# Patient Record
Sex: FEMALE | Race: WHITE | NOT HISPANIC OR LATINO | Employment: UNEMPLOYED | URBAN - METROPOLITAN AREA
[De-identification: names, ages, dates, MRNs, and addresses within clinical notes are randomized per-mention and may not be internally consistent; named-entity substitution may affect disease eponyms.]

---

## 2017-07-05 ENCOUNTER — NURSE TRIAGE (OUTPATIENT)
Dept: NURSING | Facility: CLINIC | Age: 27
End: 2017-07-05

## 2017-07-05 NOTE — TELEPHONE ENCOUNTER
"Patient calling reporting \"cough\", sore throat, headache starting 7/3/17. Patient reporting coughing up streaks of blood today. Chest pain when breathing in and out, along with intermittent wheezing.   Reporting history of asthma and allergies. Patient has taken Albuterol inhaler with improvement.     Reason for Disposition    Wheezing is present    Additional Information    Negative: Severe difficulty breathing (e.g., struggling for each breath, speaks in single words)    Negative: Bluish lips, tongue, or face now    Negative: [1] Difficulty breathing AND [2] exposure to flames, smoke, or fumes    Negative: [1] Stridor AND [2] difficulty breathing    Negative: Sounds like a life-threatening emergency to the triager    Negative: [1] Previous asthma attacks AND [2] this feels like asthma attack    Negative: Dry (non-productive) cough (i.e., no sputum or minimal clear sputum)    Negative: Chest pain  (Exception: MILD central chest pain, present only when coughing)    Negative: Difficulty breathing    Negative: Patient sounds very sick or weak to the triager    Negative: [1] Coughed up blood AND [2] > 1 tablespoon (15 ml) (Exception: blood-tinged sputum)    Negative: Fever > 103 F (39.4 C)    Negative: [1] Fever > 101 F (38.3 C) AND [2] age > 60    Negative: [1] Fever > 101 F (38.3 C) AND [2] bedridden (e.g., nursing home patient, CVA, chronic illness, recovering from surgery)    Negative: [1] Fever > 100.5 F (38.1 C) AND [2] diabetes mellitus or weak immune system (e.g., HIV positive, cancer chemo, splenectomy, organ transplant, chronic steroids)    Protocols used: COUGH - ACUTE PRODUCTIVE-ADULT-AH    "

## 2021-12-23 ENCOUNTER — NURSE TRIAGE (OUTPATIENT)
Dept: NURSING | Facility: CLINIC | Age: 31
End: 2021-12-23
Payer: COMMERCIAL

## 2021-12-23 NOTE — TELEPHONE ENCOUNTER
Patient calling with symptoms of pressure in sinuses and head since yesterday. Has some yellowish discharge from nose.    Headache 7-8/10- has not taken Tylenol or Ibu  T-98.2    Per protocol, recommendations are for homecare.  Care advice given including nasal washes and pain reliever. Advised patient to call back if they develop any new or worsening symptoms. Patient verbalizes understanding and agrees with plan of care.    Doris Francisco RN  12/23/21 8:57 AM  [facility] Nurse Advisor    Reason for Disposition    Sinus congestion as part of a cold, present < 10 days    Additional Information    Negative: SEVERE headache and has fever    Negative: Patient sounds very sick or weak to the triager    Negative: Severe headache    Negative: Redness or swelling on the cheek, forehead, or around the eye    Negative: Fever > 100.0 F (37.8 C) and bedridden (e.g., nursing home patient, stroke, chronic illness, recovering from surgery)    Negative: Fever > 100.0 F (37.8 C) and has diabetes mellitus or a weak immune system (e.g., HIV positive, cancer chemotherapy, organ transplant, splenectomy, chronic steroids)    Negative: Fever > 101 F (38.3 C) and over 60 years of age    Negative: Fever > 103 F (39.4 C)    Negative: Fever present > 3 days (72 hours)    Negative: Fever returns after gone for over 24 hours and symptoms worse or not improved    Negative: Sinus pain (not just congestion) and fever    Negative: Earache    Negative: Difficulty breathing, and not from stuffy nose (e.g., not relieved by cleaning out the nose)    Negative: Sounds like a life-threatening emergency to the triager    Negative: SEVERE sinus pain    Negative: Sinus congestion (pressure, fullness) present > 10 days    Negative: Nasal discharge present > 10 days    Negative: Using nasal washes and pain medicine > 24 hours and sinus pain (lower forehead, cheekbone, or eye) persists    Negative: Lots of coughing    Negative: Patient wants to be  seen    Protocols used: SINUS PAIN OR CONGESTION-A-OH  COVID 19 Nurse Triage Plan/Patient Instructions    Please be aware that novel coronavirus (COVID-19) may be circulating in the community. If you develop symptoms such as fever, cough, or SOB or if you have concerns about the presence of another infection including coronavirus (COVID-19), please contact your health care provider or visit [email or web address].     Disposition/Instructions    Home care recommended. Follow home care protocol based instructions.    Thank you for taking steps to prevent the spread of this virus.  o Limit your contact with others.  o Wear a simple mask to cover your cough.  o Wash your hands well and often.    Resources    [facility]: About COVID-19: [email or web address]    CDC: What to Do If You're Sick: www.cdc.gov/coronavirus/2019-ncov/about/steps-when-sick.html    CDC: Ending Home Isolation: www.cdc.gov/coronavirus/2019-ncov/hcp/disposition-in-home-patients.html     CDC: Caring for Someone: www.cdc.gov/coronavirus/2019-ncov/if-you-are-sick/care-for-someone.html     [facility]: Interim Guidance for Hospital Discharge to Home: www.health.[facility].mn.us/diseases/coronavirus/hcp/hospdischarge.pdf    [facility] clinical trials (COVID-19 research studies): [email or web address]     Below are the COVID-19 hotlines at the [facility] of Health ([facility]). Interpreters are available.   o For health questions: Call 076-274-4960 or 1-419.567.2472 (7 a.m. to 7 p.m.)  o For questions about schools and childcare: Call [phone] or [phone] (7 a.m. to 7 p.m.)

## 2023-01-04 ENCOUNTER — NURSE TRIAGE (OUTPATIENT)
Dept: NURSING | Facility: CLINIC | Age: 33
End: 2023-01-04

## 2023-01-04 NOTE — TELEPHONE ENCOUNTER
"Nurse Triage SBAR    Is this a 2nd Level Triage? No - not established with [facility]    Situation/Background: Patient is calling that she tested positive for Covid today. Patient reports that her symptoms started Tuesday morning with slight headache, sore throat, cough, chest feels heavy, stuffy but runny nose, fatigue as of this morning. Hot/cold sweats, has not checked temperature for a fever.     Patient states she has a history of asthma, bronchospasm, with respiratory concerns.     Assessment:   Weight 160 lb, 69\"  No pain but chest pressure when coughing    Treatment Options:   Patient classified as COVID treatment eligible by Epic high risk algorithm: Yes  Is the patient symptomatic at the time of result notification? Yes. Was the onset of symptoms within the last 5 days? Yes.   There are now oral medications available for the treatment of COVID-19.  Taking one of these medications within the first five days of symptoms (when people may not yet feel severely ill) has been shown to make people feel better, prevent them from getting sicker, and preventing hospitalization and death.   Does the patient agree to have a visit with a provider to discuss treatment options? Yes. Is the patient seen at [facility]?  No: Warm transfer caller to [phone] to be scheduled with a virtual urgent provider.  During transfer, instruct  on appropriate time frame for visit     Review information with Patient    Your result was positive. This means you have COVID-19 (coronavirus).    How can I protect others?    These guidelines are for isolating before returning to work, school or .    If you DO have symptoms    Stay home and away from others     For at least 5 days after your symptoms started, AND    You are fever free for 24 hours (with no medicine that reduces fever), AND    Your other symptoms are better    Wear a mask for 10 full days anytime you are around others    If you DON'T have " symptoms    Stay home and away from others for at least 5 days after your positive test    Wear a mask for 10 full days anytime you are around others    There may be different guidelines for healthcare facilities.  Please check with the specific sites before arriving.    If you have been told by a doctor that you were severely ill with COVID-19 or are immunocompromised, you should isolate for at least 10 days.    You should not go back to work until you meet the guidelines above for ending your home isolation. You don't need to be retested for COVID-19 before going back to work--studies show that you won't spread the virus if it's been at least 10 days since your symptoms started (or 20 days, if you have a weak immune system).      How can I take care of myself?      Get lots of rest. Drink extra fluids (unless a doctor has told you not to).      Take Tylenol (acetaminophen) for fever or pain. If you have liver or kidney problems, ask your family doctor if it's okay to take Tylenol.     Take either:     650 mg (two 325 mg pills) every 4 to 6 hours, or     1,000 mg (two 500 mg pills) every 8 hours as needed.     Note: Do not take more than 3,000 mg in one day. Acetaminophen is found in many medicines (both prescribed and over-the-counter medicines). Read all labels to be sure you don't take too much.    For children, check the Tylenol bottle for the right dose (based on their age or weight).      If you have other health problems (like cancer, heart failure, an organ transplant or severe kidney disease): Call your specialty clinic if you don't feel better in the next 2 days.      Know when to call 911: Emergency warning signs include:    Trouble breathing or shortness of breath    Pain or pressure in the chest that doesn't go away    Feeling confused like you haven't felt before, or not being able to wake up  Bluish-colored lips or face    Recommendation: Per disposition, See HCP tian 4 hours (or PCP Triage).  Patient not established with [facility]. Recommended UC; provided MD Test to Treat and [facility] Treatment visit as options other than [facility]. Advised patient to call back with any new or worsening symptoms. Patient verbalized understanding and agrees with plan. Transferred patient to scheduling.     Protocol Recommended Disposition: Urgent care center    Radha Bustamante RN on 1/4/2023 at 10:20 AM  [facility] Nurse Advisors    Reason for Disposition    MILD difficulty breathing (e.g., minimal/no SOB at rest, SOB with walking, pulse <100)    Additional Information    Negative: SEVERE difficulty breathing (e.g., struggling for each breath, speaks in single words)    Negative: Difficult to awaken or acting confused (e.g., disoriented, slurred speech)    Negative: Bluish (or gray) lips or face now    Negative: Shock suspected (e.g., cold/pale/clammy skin, too weak to stand, low BP, rapid pulse)    Negative: Sounds like a life-threatening emergency to the triager    Negative: [1] Diagnosed or suspected COVID-19 AND [2] symptoms lasting 3 or more weeks    Negative: [1] COVID-19 exposure AND [2] no symptoms    Negative: COVID-19 vaccine reaction suspected (e.g., fever, headache, muscle aches) occurring 1 to 3 days after getting vaccine    Negative: COVID-19 vaccine, questions about    Negative: [1] Lives with someone known to have influenza (flu test positive) AND [2] flu-like symptoms (e.g., cough, runny nose, sore throat, SOB; with or without fever)    Negative: [1] Adult with possible COVID-19 symptoms AND [2] triager concerned about severity of symptoms or other causes    Negative: COVID-19 and breastfeeding, questions about    Negative: SEVERE or constant chest pain or pressure  (Exception: Mild central chest pain, present only when coughing.)    Negative: MODERATE difficulty breathing (e.g., speaks in phrases, SOB even at rest, pulse 100-120)    Negative: [1] Headache AND [2] stiff neck (can't touch chin to chest)     Negative: Oxygen level (e.g., pulse oximetry) 90 percent or lower     Not available    Negative: Chest pain or pressure    Negative: Patient sounds very sick or weak to the triager    Protocols used: CORONAVIRUS (COVID-19) DIAGNOSED OR [id number]

## 2023-01-25 ENCOUNTER — OFFICE VISIT (OUTPATIENT)
Dept: URGENT CARE | Facility: URGENT CARE | Age: 33
End: 2023-01-25
Payer: MEDICAID

## 2023-01-25 VITALS
BODY MASS INDEX: 23.7 KG/M2 | TEMPERATURE: 99.1 F | OXYGEN SATURATION: 98 % | WEIGHT: 160 LBS | RESPIRATION RATE: 16 BRPM | HEART RATE: 87 BPM | HEIGHT: 69 IN

## 2023-01-25 DIAGNOSIS — N76.0 BACTERIAL VAGINITIS: ICD-10-CM

## 2023-01-25 DIAGNOSIS — J02.9 SORE THROAT: ICD-10-CM

## 2023-01-25 DIAGNOSIS — R30.0 DYSURIA: ICD-10-CM

## 2023-01-25 DIAGNOSIS — B96.89 BACTERIAL VAGINITIS: ICD-10-CM

## 2023-01-25 DIAGNOSIS — J02.0 STREPTOCOCCAL PHARYNGITIS: Primary | ICD-10-CM

## 2023-01-25 LAB
ALBUMIN UR-MCNC: 30 MG/DL
APPEARANCE UR: CLEAR
BACTERIA #/AREA URNS HPF: ABNORMAL /HPF
BILIRUB UR QL STRIP: ABNORMAL
CLUE CELLS: PRESENT
COLOR UR AUTO: YELLOW
DEPRECATED S PYO AG THROAT QL EIA: POSITIVE
GLUCOSE UR STRIP-MCNC: NEGATIVE MG/DL
HGB UR QL STRIP: NEGATIVE
KETONES UR STRIP-MCNC: ABNORMAL MG/DL
LEUKOCYTE ESTERASE UR QL STRIP: ABNORMAL
NITRATE UR QL: NEGATIVE
PH UR STRIP: 6 [PH] (ref 5–7)
RBC #/AREA URNS AUTO: ABNORMAL /HPF
SP GR UR STRIP: 1.02 (ref 1–1.03)
SQUAMOUS #/AREA URNS AUTO: ABNORMAL /LPF
TRICHOMONAS, WET PREP: ABNORMAL
UROBILINOGEN UR STRIP-ACNC: 2 E.U./DL
WBC #/AREA URNS AUTO: ABNORMAL /HPF
WBC'S/HIGH POWER FIELD, WET PREP: ABNORMAL
YEAST, WET PREP: ABNORMAL

## 2023-01-25 PROCEDURE — 87591 N.GONORRHOEAE DNA AMP PROB: CPT | Performed by: NURSE PRACTITIONER

## 2023-01-25 PROCEDURE — 87880 STREP A ASSAY W/OPTIC: CPT | Performed by: NURSE PRACTITIONER

## 2023-01-25 PROCEDURE — 81001 URINALYSIS AUTO W/SCOPE: CPT | Performed by: NURSE PRACTITIONER

## 2023-01-25 PROCEDURE — 87491 CHLMYD TRACH DNA AMP PROBE: CPT | Performed by: NURSE PRACTITIONER

## 2023-01-25 PROCEDURE — 87210 SMEAR WET MOUNT SALINE/INK: CPT | Performed by: NURSE PRACTITIONER

## 2023-01-25 PROCEDURE — 99214 OFFICE O/P EST MOD 30 MIN: CPT | Performed by: NURSE PRACTITIONER

## 2023-01-25 RX ORDER — METRONIDAZOLE 500 MG/1
500 TABLET ORAL 2 TIMES DAILY
Qty: 14 TABLET | Refills: 0 | Status: SHIPPED | OUTPATIENT
Start: 2023-01-25 | End: 2023-02-01

## 2023-01-25 RX ORDER — AMOXICILLIN 500 MG/1
500 CAPSULE ORAL 2 TIMES DAILY
Qty: 20 CAPSULE | Refills: 0 | Status: SHIPPED | OUTPATIENT
Start: 2023-01-25 | End: 2023-02-04

## 2023-01-25 NOTE — PROGRESS NOTES
Chief Complaint   Patient presents with     Urgent Care     Derm Problem     All over rash started yesterday, itchy and hives. Had Covid 2 1/2 weeks ago and took Paxlovid, thought she may have had strep throat, some burning with urination as well.          ICD-10-CM    1. Streptococcal pharyngitis  J02.0 amoxicillin (AMOXIL) 500 MG capsule      2. Dysuria  R30.0 UA macro with reflex to Microscopic and Culture - Clinc Collect     Wet prep - Clinic Collect     Chlamydia trachomatis PCR - Clinic Collect     Neisseria gonorrhoeae PCR - Clinic Collect     Urine Microscopic      3. Sore throat  J02.9 Streptococcus A Rapid Screen w/Reflex to PCR - Clinic Collect      4. Bacterial vaginitis  N76.0 metroNIDAZOLE (FLAGYL) 500 MG tablet    B96.89         Medications as prescribed.  Urinalysis appears to be contaminated but the amoxicillin will likely cover UTI if she does have 1 underlying the BV.  Encouraged her to drink extra water, rest.  If she worsens she is instructed to go to the emergency room for further assessment.      Results for orders placed or performed in visit on 01/25/23 (from the past 24 hour(s))   UA macro with reflex to Microscopic and Culture - Clinc Collect    Specimen: Urine, Midstream   Result Value Ref Range    Color Urine Yellow Colorless, Straw, Light Yellow, Yellow    Appearance Urine Clear Clear    Glucose Urine Negative Negative mg/dL    Bilirubin Urine Moderate (A) Negative    Ketones Urine Trace (A) Negative mg/dL    Specific Gravity Urine 1.020 1.003 - 1.035    Blood Urine Negative Negative    pH Urine 6.0 5.0 - 7.0    Protein Albumin Urine 30 (A) Negative mg/dL    Urobilinogen Urine 2.0 (A) 0.2, 1.0 E.U./dL    Nitrite Urine Negative Negative    Leukocyte Esterase Urine Small (A) Negative   Wet prep - Clinic Collect    Specimen: Vagina; Swab   Result Value Ref Range    Trichomonas Absent Absent    Yeast Absent Absent    Clue Cells Present (A) Absent    WBCs/high power field 2+ (A) None   Urine  "Microscopic   Result Value Ref Range    Bacteria Urine Moderate (A) None Seen /HPF    RBC Urine 2-5 (A) 0-2 /HPF /HPF    WBC Urine 5-10 (A) 0-5 /HPF /HPF    Squamous Epithelials Urine Many (A) None Seen /LPF    Narrative    Urine Culture not indicated   Streptococcus A Rapid Screen w/Reflex to PCR - Clinic Collect    Specimen: Throat; Swab   Result Value Ref Range    Group A Strep antigen Positive (A) Negative       Subjective     Barbara French is an 32 year old female who presents to clinic today for rash all over body, started a day ago on the chest and shoulders and has moved into arms and legs.  Sore throat for about 9 days    Took an old prescription of amoxicillin for the last 5 days, stopped yesterday.Took Nitrofurantoin for two days and stopped this 4 days ago.      Also dysuria and low pelvic pain, white vaginal discharge and irritation for 2 days.      ROS: 10 point ROS neg other than the symptoms noted above in the HPI.       Objective    Pulse 87   Temp 99.1  F (37.3  C) (Temporal)   Resp 16   Ht 1.746 m (5' 8.75\")   Wt 72.6 kg (160 lb)   SpO2 98%   BMI 23.80 kg/m    Nurses notes and VS have been reviewed.    Physical Exam       GENERAL APPEARANCE: healthy appearing, alert     EYES: PERRL, EOMI, sclera non-icteric     HENT: ear canals and TM's normal, nose and mouth without ulcers or lesions, tonsillar hypertrophy and tonsillar erythema     NECK: no adenopathy or asymmetry, thyroid normal to palpation     RESP: lungs clear to auscultation - no rales, rhonchi or wheezes     CV: regular rates and rhythm, no murmurs, rubs, or gallop     SKIN: no suspicious lesions or rashes    Patient Instructions   You have a bacterial infection called bacterial vaginosis/vagintis. This is not a sexually transmitted disease and your partner does not need to be treated.    Please take the medications as prescribed and do not have sexual intercourse until all the medication is gone.  Do not drink alcohol while " taking this medications or you will become very sick.     This medication may interfere with birth control medications. While taking the antibiotic I would recommend using a second method of birth control.    Follow up if symptoms fail to improve or worsen.       Streptococcus bacteria (strep throat) causes a severe sore throat, rash, fevers, swollen glands and an upset stomach.  If not treated appropriately/completely, it can damage the valves in the heart and the kidneys.  Take all medications until completely gone even though you may be feeling better before your doses end. This helps to prevent bacteria from becoming resistant to antibiotics and prevents super bugs from developing.    The body will fight this infection but it needs to be treated well in order to help heal itself.  Rest as needed.  It is ok to reduce food intake if appetite is poor but it is quite important to maintain/increase fluid intake.    For pain and fevers, acetaminophen (Tylenol) is most appropriate.  Ibuprofen (Advil) or naproxen (Aleve) are useful too and last longer but they can cause elevation of blood pressure or stomach problems.    A warm, salt water gargle will help soothe the throat.  This can be made with 1/4 tsp of salt per 8 oz of water).    If the symptoms get worse or last longer than a week, you should contact the clinic.       LORI Azar, [facility] Urgent Care Provider    The use of Dragon/PredictionIO dictation services may have been used to construct the content in this note; any grammatical or spelling errors are non-intentional. Please contact the author of this note directly if you are in need of any clarification.

## 2023-01-25 NOTE — PATIENT INSTRUCTIONS
You have a bacterial infection called bacterial vaginosis/vagintis. This is not a sexually transmitted disease and your partner does not need to be treated.    Please take the medications as prescribed and do not have sexual intercourse until all the medication is gone.  Do not drink alcohol while taking this medications or you will become very sick.     This medication may interfere with birth control medications. While taking the antibiotic I would recommend using a second method of birth control.    Follow up if symptoms fail to improve or worsen.       Streptococcus bacteria (strep throat) causes a severe sore throat, rash, fevers, swollen glands and an upset stomach.  If not treated appropriately/completely, it can damage the valves in the heart and the kidneys.  Take all medications until completely gone even though you may be feeling better before your doses end. This helps to prevent bacteria from becoming resistant to antibiotics and prevents super bugs from developing.    The body will fight this infection but it needs to be treated well in order to help heal itself.  Rest as needed.  It is ok to reduce food intake if appetite is poor but it is quite important to maintain/increase fluid intake.    For pain and fevers, acetaminophen (Tylenol) is most appropriate.  Ibuprofen (Advil) or naproxen (Aleve) are useful too and last longer but they can cause elevation of blood pressure or stomach problems.    A warm, salt water gargle will help soothe the throat.  This can be made with 1/4 tsp of salt per 8 oz of water).    If the symptoms get worse or last longer than a week, you should contact the clinic.

## 2023-01-25 NOTE — LETTER
January 25, 2023      Barbara MANUEL Manolo  [address] [facility] [address]        To Whom It May Concern:    Barbara MANUEL Manolo  was seen on 1/25/2023.  Please excuse her  until 1/27/2023 due to illness.        Sincerely,        Gosia Kidd, CNP

## 2023-01-26 LAB
C TRACH DNA SPEC QL NAA+PROBE: NEGATIVE
N GONORRHOEA DNA SPEC QL NAA+PROBE: NEGATIVE

## 2023-04-02 ENCOUNTER — HEALTH MAINTENANCE LETTER (OUTPATIENT)
Age: 33
End: 2023-04-02

## 2023-09-13 ENCOUNTER — NURSE TRIAGE (OUTPATIENT)
Dept: NURSING | Facility: CLINIC | Age: 33
End: 2023-09-13
Payer: COMMERCIAL

## 2023-09-13 NOTE — TELEPHONE ENCOUNTER
"Pt had head injury, Saturday night. [place] in [place], foot got caught on log and pt fell down anuel about 10 feet. Hit head twice from fall  Mild nausea, started within last 48 hours.  Mild headache, on temple to top of head, since Sunday when waking up  Took Advil today, does not seem to help with headache, does not seem to get better or worsening.    Triage to ED, care advice given.    Manolo Guajardo RN, BSN  9/13/2023 at 5:07 PM  [facility]        Reason for Disposition   Dangerous injury (e.g., MVA, diving, trampoline, contact sports, fall > 10 feet or 3 meters) or severe blow from hard object (e.g., golf club or baseball bat)    Additional Information   Negative: [1] ACUTE NEURO SYMPTOM AND [2] present now  (DEFINITION: difficult to awaken OR confused thinking and talking OR slurred speech OR weakness of arms OR unsteady walking)   Negative: Knocked out (unconscious) > 1 minute   Negative: Seizure (convulsion) occurred  (Exception: Prior history of seizures and now alert and without Acute Neuro Symptoms.)   Negative: Penetrating head injury (e.g., knife, gun shot wound, metal object)   Negative: [1] Major bleeding (e.g., actively dripping or spurting) AND [2] can't be stopped   Negative: [1] Dangerous mechanism of injury (e.g., MVA, diving, trampoline, contact sports, fall > 10 feet or 3 meters) AND [2] NECK pain AND [3] began < 1 hour after injury   Negative: Sounds like a life-threatening emergency to the triager   Negative: Can't remember what happened (amnesia)   Negative: Vomiting once or more   Negative: [1] Loss of vision or double vision AND [2] present now   Negative: Watery or blood-tinged fluid dripping from the NOSE or EARS now  (Exception: Tears from crying or nosebleed from nasal trauma.)   Negative: [1] One or two \"black eyes\" (bruising, purple color of eyelids) AND [2] onset within 24 hours of head injury   Negative: Large swelling or bruise > 2 inches (5 cm)   Negative: " Skin is split open or gaping  (or length > 1/2 inch or 12 mm)   Negative: [1] Bleeding AND [2] won't stop after 10 minutes of direct pressure (using correct technique)   Negative: [1] ACUTE NEURO SYMPTOM AND [2] now fine  (DEFINITION: difficult to awaken OR confused thinking and talking OR slurred speech OR weakness of arms OR unsteady walking)   Negative: [1] Knocked out (unconscious) < 1 minute AND [2] now fine   Negative: [1] SEVERE headache AND [2] not improved 2 hours after pain medicine/ice packs   Negative: Sounds like a serious injury to the triager    Protocols used: Head Injury-A-AH

## 2023-09-14 ENCOUNTER — TELEPHONE (OUTPATIENT)
Dept: FAMILY MEDICINE | Facility: CLINIC | Age: 33
End: 2023-09-14

## 2023-09-14 NOTE — TELEPHONE ENCOUNTER
Patient was triaged yesterday and protocol directed patient to the ER.  Patient did not go and is wondering what they would do if she were to come to Urgent Care instead.  Advised they can do a neuro check, but we do not have a scanner in the clinic.  Slight HA that worsens throughout the day and she does work on computers.  She will try to go to Urgent Care today to get more guidance.  Florence Batista RN  [facility]

## 2023-12-15 RX ORDER — ETONOGESTREL 68 MG/1
1 IMPLANT SUBCUTANEOUS ONCE
COMMUNITY

## 2023-12-15 NOTE — PROGRESS NOTES
.Barbara is a 33 year old  female who presents for annual exam.     Besides routine health maintenance, she has no other health concerns today .  HPI:  Barbara is here for her annual exam. She has been feeling well. She has a Nexplanon placed in . She recently started normal menses while on it.   Barbara has a family history of breast cancer. She states her mother had it 13 years ago at the age of 49 or 50. She doesn't know what workup was done, but states she can get the information. She was wondering when she needed to start mammograms.   Hx of asthma. States it is under control. Requests refill on albuterol.   Menses are regular q 28-30 days and normal lasting 7 days.   Menses flow: normal.    Patient's last menstrual period was 2023 (approximate)..   Using Nexplanon for contraception.  Nexplanon inserted on 2021.  She is not currently considering pregnancy.    REPRODUCTIVE/GYNECOLOGIC HISTORY:  Barbara is sexually active with male partner(s) and is not currently in monogamous relationship.   STI testing offered?  Accepted  History of abnormal Pap smear:  Yes   SOCIAL HISTORY  Abuse: does not report having previously been physical or sexually abused.    Do you feel safe in your environment? YES      She  reports that she has been smoking cigarettes. She has been smoking an average of 0.5 packs per day. She has never used smokeless tobacco.  Tobacco Cessation Action Plan: Information offered: Patient not interested at this time    Last PHQ-9 score on record =       2023    11:10 AM   PHQ-9 SCORE   PHQ-9 Total Score 2     Last GAD7 score on record =       2023    11:10 AM   DONTAE-7 SCORE   Total Score 7     Alcohol Score = 4    HEALTH MAINTENANCE:    Overdue          Never done ASTHMA ACTION PLAN (Yearly)     Never done ASTHMA CONTROL TEST (Every 6 Months)     Never done ADVANCE CARE PLANNING (Every 5 Years)     Never done Pneumococcal Vaccine: Pediatrics (0 to 5 Years) and At-Risk  Patients (6 to 64 Years) (1 - PCV)     Never done HIV SCREENING (Once)     Never done HEPATITIS C SCREENING (Once)     SEP 29   2007 HPV IMMUNIZATION (3 - 3-dose series)  Last completed: 2007     MAR 6   2013 YEARLY PREVENTIVE VISIT (Yearly)  Last completed: Mar 6, 2012     MAR 7   2014 NICOTINE/TOBACCO CESSATION COUNSELING Q 1 YR (Yearly)  Last completed: Mar 7, 2013     YASHIRA 1   2023 PHQ-2 (once per calendar year) (Yearly, January to December)  Last completed: May 11, 2016     SEP 1   2023 INFLUENZA VACCINE (1)  Last completed: Dec 8, 2022     SEP 1   2023 COVID-19 Vaccine ( season)  Last completed: Dec 8, 2022           HEALTHY HABITS  Eating habits: eats regular meals and follows a balanced nutrition diet  Calcium/Vitamin D intake: source:  dairy   Exercise: How often do you exercise? none  Have you had an eye exam in the last two years? yes  Do you routinely see the dentist once or twice yearly? yes      HISTORY:  OB History    Para Term  AB Living   2 0 0 0 2 0   SAB IAB Ectopic Multiple Live Births   0 0 0 0 0      # Outcome Date GA Lbr Wali/2nd Weight Sex Delivery Anes PTL Lv   2 AB            1 AB              Past Medical History:   Diagnosis Date    History of cervical dysplasia     2012- [facility]> STEPHANI 3 on colpo > LEEP    Mild intermittent asthma      Past Surgical History:   Procedure Laterality Date    INSERT CONTRACEPTIVE IMPLANT - NEXPLANON/IMPLANON  2021    LEEP TX, CERVICAL  2012    STEPHANI III, clear margins     Family History   Problem Relation Age of Onset    Family History Negative Father     Family History Negative Brother     Breast Cancer Mother      Social History     Socioeconomic History    Marital status: Single    Number of children: 0   Occupational History    Occupation: student     Employer: [employer]     Comment: [employer]   Tobacco Use    Smoking status: Every Day     Packs/day: .5     Types: Cigarettes    Smokeless tobacco: Never    Tobacco  comments:     daily(10 cigar) a day   Substance and Sexual Activity    Alcohol use: Yes     Comment: once a week    Drug use: No    Sexual activity: Yes     Partners: Male     Birth control/protection: None   Other Topics Concern    Parent/sibling w/ CABG, MI or angioplasty before 65F 55M? No   Social History Narrative    Balanced Diet - Yes    Osteoporosis Prevention Measures - Dairy servings per day: 5 servings daily    Regular Exercise -  Yes [facility] 1 [facility], running and weights    Dental Exam - YES - Date: 1-07    Eye Exam - YES - Date: 12-06    Self Breast Exam - Yes    Abuse: Current or Past (Physical, Sexual or Emotional)- No    Do you feel safe in your environment - Yes    Guns stored in the home - Yes, locked    Sunscreen used - Yes    Seatbelts used - Yes    Lipids -  YES - Date: 5-03    Glucose -  NO    Colon Cancer Screening - No    Hemoccults - NO    Pap Test -  YES - Date: 11-05    Do you have any concerns about STD's -  No    Mammography - NO    DEXA - NO    Immunizations reviewed and up to date - Yes, last td 5-03    3-29-07   [facility] Fernandez [facility]            Current Outpatient Medications:     albuterol (PROAIR HFA/PROVENTIL HFA/VENTOLIN HFA) 108 (90 Base) MCG/ACT inhaler, Inhale 2 puffs into the lungs every 4 hours as needed for shortness of breath or wheezing, Disp: 8 g, Rfl: 3    etonogestrel (NEXPLANON) 68 MG IMPL, Inject 1 Device Subcutaneous once, Disp: , Rfl:     fluticasone (FLONASE) 50 MCG/ACT nasal spray, Spray 1-2 sprays into both nostrils daily (Patient not taking: Reported on 12/21/2023), Disp: 1 Package, Rfl: 11    montelukast (SINGULAIR) 10 MG tablet, Take 1 tablet (10 mg) by mouth At Bedtime (Patient not taking: Reported on 12/21/2023), Disp: 30 tablet, Rfl: 1     Allergies   Allergen Reactions    No Known Allergies     Amoxicillin Rash       Past medical, surgical, social and family history were reviewed and updated in EPIC.    ROS:   12 point review of  "systems negative other than symptoms noted below or in the HPI.    PHYSICAL EXAM:  /76   Ht 1.768 m (5' 9.6\")   Wt 74.8 kg (165 lb)   LMP 12/07/2023 (Approximate)   BMI 23.95 kg/m     BMI: Body mass index is 23.95 kg/m .  Constitutional: healthy, alert and no distress  Neck: symmetrical, thyroid normal size, no masses present, no lymphadenopathy present.   Cardiovascular: RRR, no murmurs present  Respiratory: breathing unlabored, lungs CTA bilaterally  Breast:normal without masses, tenderness or nipple discharge and no palpable axillary masses or adenopathy  Gastrointestinal: abdomen soft, non-tender, bowel sounds present  PELVIC EXAM:  Vulva: No lesions, no adenopathy, BUS WNL  Vagina: Moist, pink, discharge normal  well rugated, no lesions  Cervix:smooth, pink, no visible lesions  Uterus: Normal size, non-tender, mobile  Ovaries: No masses palpated  Rectal exam: deferred    ASSESSMENT/PLAN:    ICD-10-CM    1. Encounter for gynecological examination without abnormal finding  Z01.419       2. Encounter for screening for cervical cancer  Z12.4 Adult Oncology/Hematology  Referral      3. Family history of breast cancer  Z80.3 Pap thin layer screen with HPV - recommended age 30 - 65 years     Pap thin layer screen with HPV - recommended age 30 - 65 years      4. Intermittent asthma, uncomplicated  J45.20 albuterol (PROAIR HFA/PROVENTIL HFA/VENTOLIN HFA) 108 (90 Base) MCG/ACT inhaler        No results found for any visits on 12/21/23.      COUNSELING:   Reviewed preventive health counseling, as reflected in patient instructions   reports that she has been smoking cigarettes. She has been smoking an average of 0.5 packs per day. She has never used smokeless tobacco.      We discussed that normally mammograms start 10 years before 1st degree relative was dx with breast cancer (so starting at 39 years old). Offered genetic counseling to go over her family history and update recommendation, if indicated. " She is accepting.     Reviewed that Nexplanon is effective as birth control for 5 years. It is reasonable to switch out Nexplanon to see if bleeding profile improves. If is doesn't, then it is reasonable to assume Nexplanon will not suppress symptoms anymore.       Shari Da Silva, DNP, APRN, CNM

## 2023-12-21 ENCOUNTER — PATIENT OUTREACH (OUTPATIENT)
Dept: ONCOLOGY | Facility: CLINIC | Age: 33
End: 2023-12-21

## 2023-12-21 ENCOUNTER — OFFICE VISIT (OUTPATIENT)
Dept: MIDWIFE SERVICES | Facility: CLINIC | Age: 33
End: 2023-12-21
Payer: COMMERCIAL

## 2023-12-21 VITALS
HEIGHT: 70 IN | SYSTOLIC BLOOD PRESSURE: 122 MMHG | WEIGHT: 165 LBS | BODY MASS INDEX: 23.62 KG/M2 | DIASTOLIC BLOOD PRESSURE: 76 MMHG

## 2023-12-21 DIAGNOSIS — Z01.419 ENCOUNTER FOR GYNECOLOGICAL EXAMINATION WITHOUT ABNORMAL FINDING: Primary | ICD-10-CM

## 2023-12-21 DIAGNOSIS — J45.20 INTERMITTENT ASTHMA, UNCOMPLICATED: ICD-10-CM

## 2023-12-21 DIAGNOSIS — Z80.3 FAMILY HISTORY OF BREAST CANCER: ICD-10-CM

## 2023-12-21 DIAGNOSIS — Z12.4 ENCOUNTER FOR SCREENING FOR CERVICAL CANCER: ICD-10-CM

## 2023-12-21 PROCEDURE — G0145 SCR C/V CYTO,THINLAYER,RESCR: HCPCS | Performed by: ADVANCED PRACTICE MIDWIFE

## 2023-12-21 PROCEDURE — 99385 PREV VISIT NEW AGE 18-39: CPT | Performed by: ADVANCED PRACTICE MIDWIFE

## 2023-12-21 PROCEDURE — 87624 HPV HI-RISK TYP POOLED RSLT: CPT | Performed by: ADVANCED PRACTICE MIDWIFE

## 2023-12-21 RX ORDER — ALBUTEROL SULFATE 90 UG/1
2 AEROSOL, METERED RESPIRATORY (INHALATION) EVERY 4 HOURS PRN
Qty: 8 G | Refills: 3 | Status: SHIPPED | OUTPATIENT
Start: 2023-12-21 | End: 2024-09-26

## 2023-12-21 ASSESSMENT — ANXIETY QUESTIONNAIRES
GAD7 TOTAL SCORE: 7
GAD7 TOTAL SCORE: 7
2. NOT BEING ABLE TO STOP OR CONTROL WORRYING: SEVERAL DAYS
7. FEELING AFRAID AS IF SOMETHING AWFUL MIGHT HAPPEN: SEVERAL DAYS
1. FEELING NERVOUS, ANXIOUS, OR ON EDGE: SEVERAL DAYS
6. BECOMING EASILY ANNOYED OR IRRITABLE: SEVERAL DAYS
IF YOU CHECKED OFF ANY PROBLEMS ON THIS QUESTIONNAIRE, HOW DIFFICULT HAVE THESE PROBLEMS MADE IT FOR YOU TO DO YOUR WORK, TAKE CARE OF THINGS AT HOME, OR GET ALONG WITH OTHER PEOPLE: SOMEWHAT DIFFICULT
5. BEING SO RESTLESS THAT IT IS HARD TO SIT STILL: SEVERAL DAYS
3. WORRYING TOO MUCH ABOUT DIFFERENT THINGS: SEVERAL DAYS

## 2023-12-21 ASSESSMENT — PATIENT HEALTH QUESTIONNAIRE - PHQ9
SUM OF ALL RESPONSES TO PHQ QUESTIONS 1-9: 2
5. POOR APPETITE OR OVEREATING: SEVERAL DAYS

## 2023-12-21 NOTE — PROGRESS NOTES
Writer received referral to Cancer Risk Management/Genetic Counseling.    Referred for:     family hx of breast cancer - mother at 49.        Referral reviewed for appropriate plan, and sent to New Patient Scheduling ([phone]) for completion.    Debbie Mendoza, RN, BSN  Oncology New Patient Nurse Navigator   [facility]  [phone]

## 2023-12-27 LAB
BKR LAB AP GYN ADEQUACY: NORMAL
BKR LAB AP GYN INTERPRETATION: NORMAL
BKR LAB AP HPV REFLEX: NORMAL
BKR LAB AP PREVIOUS ABNORMAL: NORMAL
PATH REPORT.COMMENTS IMP SPEC: NORMAL
PATH REPORT.COMMENTS IMP SPEC: NORMAL
PATH REPORT.RELEVANT HX SPEC: NORMAL

## 2023-12-29 LAB
HUMAN PAPILLOMA VIRUS 16 DNA: NEGATIVE
HUMAN PAPILLOMA VIRUS 18 DNA: NEGATIVE
HUMAN PAPILLOMA VIRUS FINAL DIAGNOSIS: ABNORMAL
HUMAN PAPILLOMA VIRUS OTHER HR: POSITIVE

## 2024-01-02 ENCOUNTER — PATIENT OUTREACH (OUTPATIENT)
Dept: OBGYN | Facility: CLINIC | Age: 34
End: 2024-01-02

## 2024-01-02 NOTE — TELEPHONE ENCOUNTER
12/21/23 NIL pap, + HR HPV (not 16/18). Plan 1 year cotest   RECOM AVOIDING STRESS OR HEAVY EXERCISION.

## 2024-09-26 ENCOUNTER — MYC REFILL (OUTPATIENT)
Dept: MIDWIFE SERVICES | Facility: CLINIC | Age: 34
End: 2024-09-26

## 2024-09-26 DIAGNOSIS — J45.20 INTERMITTENT ASTHMA, UNCOMPLICATED: ICD-10-CM

## 2024-09-26 RX ORDER — ALBUTEROL SULFATE 90 UG/1
2 AEROSOL, METERED RESPIRATORY (INHALATION) EVERY 4 HOURS PRN
Qty: 8 G | Refills: 0 | Status: SHIPPED | OUTPATIENT
Start: 2024-09-26

## 2024-09-26 NOTE — TELEPHONE ENCOUNTER
"Requested Prescriptions   Pending Prescriptions Disp Refills    albuterol (PROAIR HFA/PROVENTIL HFA/VENTOLIN HFA) 108 (90 Base) MCG/ACT inhaler 8 g 3     Sig: Inhale 2 puffs into the lungs every 4 hours as needed for shortness of breath or wheezing.       Short-Acting Beta Agonist Inhalers Protocol  Failed - 9/26/2024  3:12 PM        Failed - Asthma control assessment score within normal limits in last 6 months     Please review ACT score.           Passed - Patient is age 12 or older        Passed - Medication is active on med list        Passed - Recent (6 mo) or future (30 days) visit within the authorizing provider's specialty     Patient had office visit in the last 6 months or has a visit in the next 30 days with authorizing provider or within the authorizing provider's specialty.  See \"Patient Info\" tab in inbasket, or \"Choose Columns\" in Meds & Orders section of the refill encounter.               Appt scheduled 10/1/24  Prescription approved per [facility] Refill Protocol.  Liat Ellis RN on 9/26/2024 at 3:39 PM    "

## 2024-10-14 ENCOUNTER — DOCUMENTATION ONLY (OUTPATIENT)
Dept: MIDWIFE SERVICES | Facility: CLINIC | Age: 34
End: 2024-10-14

## 2024-10-14 ENCOUNTER — MYC REFILL (OUTPATIENT)
Dept: MIDWIFE SERVICES | Facility: CLINIC | Age: 34
End: 2024-10-14

## 2024-10-14 DIAGNOSIS — J45.20 INTERMITTENT ASTHMA, UNCOMPLICATED: ICD-10-CM

## 2024-10-14 RX ORDER — ALBUTEROL SULFATE 90 UG/1
2 INHALANT RESPIRATORY (INHALATION) EVERY 4 HOURS PRN
Qty: 8 G | Refills: 0 | Status: SHIPPED | OUTPATIENT
Start: 2024-10-14 | End: 2024-11-04

## 2024-10-14 NOTE — TELEPHONE ENCOUNTER
"Requested Prescriptions   Pending Prescriptions Disp Refills    albuterol (PROAIR HFA/PROVENTIL HFA/VENTOLIN HFA) 108 (90 Base) MCG/ACT inhaler 8 g 0     Sig: Inhale 2 puffs into the lungs every 4 hours as needed for shortness of breath or wheezing.       Short-Acting Beta Agonist Inhalers Protocol  Failed - 10/14/2024 12:16 PM        Failed - Asthma control assessment score within normal limits in last 6 months     Please review ACT score.           Failed - Recent (6 mo) or future (30 days) visit within the authorizing provider's specialty     Patient had office visit in the last 6 months or has a visit in the next 30 days with authorizing provider or within the authorizing provider's specialty.  See \"Patient Info\" tab in inbasket, or \"Choose Columns\" in Meds & Orders section of the refill encounter.            Passed - Patient is age 12 or older        Passed - Medication is active on med list           Last Written Prescription Date:  9/26/24  Last Fill Quantity: 8g,  # refills: 0   Last office visit: 12/21/2023 ; last virtual visit: Visit date not found with prescribing provider:  Avinash   Future Office Visit:      Routing pt [employer] message to provider to advise.  Sophie Johnson RN on 10/14/2024 at 2:29 PM   [facility]        "

## 2024-10-14 NOTE — PROGRESS NOTES
One refill sent for Albuterol inhaler. Next refill needs to be done with Primary Care. Patient needs an updated asthma assessment on file.     Tina SANDOVAL CNM

## 2024-11-01 NOTE — PROGRESS NOTES
"NEXPLANON REMOVAL/REINSERTION:    Is a pregnancy test required: No.  Was a consent obtained?  Yes    Subjective: Barbara French is a 34 year old  presents for Nexplanon.    Patient has been given the opportunity to ask questions about all forms of birth control, including all options appropriate for Barbara French. Discussed that no method of birth control, except abstinence is 100% effective against pregnancy or sexually transmitted infection.     Barbara French understands planning removal and reinsertion today    The entire removal and insertion procedure was reviewed with the patient, including care after placement.      BP (!) 142/92   Ht 1.768 m (5' 9.6\")   Wt 76.8 kg (169 lb 6.4 oz)   LMP 10/21/2024 (Approximate)   BMI 24.59 kg/m      PROCEDURE NOTE: -- Nexplanon Removal/Insertion    Technique: On the left arm  Skin prep Betadine  Anesthesia 1% lidocaine, with epi  Procedure: Patient was placed supine with left arm exposed.  Implant located by palpitation. Kayode was made 8-10 cm above medial epicondyle and a guiding kayode 4 cm above the first.  Arm was prepped with Betadine. Incision point was anesthetized with 3 mL 1% lidocaine.     Small incision (<5mm) was made at distal end of palpable implant, curved hemostat or mosquito forceps was used to isolate the implant and bring it to the incision, the fibrous capsule containing the implant  was incised and the implant was removed intact.    After stretching the skin with thumb and index finger around the insertion site, skin punctured with the tip of the needle inserted at 30 degrees and then lowered to horizontal position. While lifting the skin with the tip of the needle, inserted the needle to its full length. Applicator was then stabilized and the slider was unlocked by pushing it slightly down. Slider moved back completely until it stopped. Applicator was then removed.    Correct placement of the implant was confirmed by palpation in the " patient's arm and visualizing the purple top of the obturator.   Bandage and pressure dressing applied to insertion site.    EBL: minimal    Complications: none    ASSESSMENT:     ICD-10-CM    1. Surveillance of previously prescribed implantable subdermal contraceptive  Z30.46 etonogestrel (NEXPLANON) subdermal implant 68 mg     REMOVAL AND REINSERTION NEXPLANON     lidocaine 1% with EPINEPHrine 1:100,000 injection 3 mL      2. Intermittent asthma, uncomplicated  J45.20 albuterol (PROAIR HFA/PROVENTIL HFA/VENTOLIN HFA) 108 (90 Base) MCG/ACT inhaler      3. Nexplanon in place  Z97.5              PLAN:    Given 's handouts, including when to have Nexplanon removed, list of danger s/sx, side effects and follow up recommended. Encouraged condom use for prevention of STD. Back up contraception advised for 7 days. Advised to call for any fever, for prolonged or severe pain or bleeding, abnormal vaginal dischage. She was advised to use pain medications (ibuprofen) as needed for mild to moderate pain.     Refill for albuterol filled today. Plan for annual exam in 6 weeks.     LORI Angulo CNM

## 2024-11-04 ENCOUNTER — OFFICE VISIT (OUTPATIENT)
Dept: MIDWIFE SERVICES | Facility: CLINIC | Age: 34
End: 2024-11-04

## 2024-11-04 VITALS
DIASTOLIC BLOOD PRESSURE: 92 MMHG | BODY MASS INDEX: 24.25 KG/M2 | SYSTOLIC BLOOD PRESSURE: 142 MMHG | HEIGHT: 70 IN | WEIGHT: 169.4 LBS

## 2024-11-04 DIAGNOSIS — Z30.46 SURVEILLANCE OF PREVIOUSLY PRESCRIBED IMPLANTABLE SUBDERMAL CONTRACEPTIVE: Primary | ICD-10-CM

## 2024-11-04 DIAGNOSIS — J45.20 INTERMITTENT ASTHMA, UNCOMPLICATED: ICD-10-CM

## 2024-11-04 PROBLEM — Z97.5 NEXPLANON IN PLACE: Status: ACTIVE | Noted: 2024-11-04

## 2024-11-04 PROCEDURE — 11983 REMOVE/INSERT DRUG IMPLANT: CPT | Performed by: ADVANCED PRACTICE MIDWIFE

## 2024-11-04 RX ORDER — ALBUTEROL SULFATE 90 UG/1
2 INHALANT RESPIRATORY (INHALATION) EVERY 4 HOURS PRN
Qty: 8 G | Refills: 0 | Status: SHIPPED | OUTPATIENT
Start: 2024-11-04

## 2024-11-04 RX ORDER — LIDOCAINE HYDROCHLORIDE AND EPINEPHRINE 10; 10 MG/ML; UG/ML
3 INJECTION, SOLUTION INFILTRATION; PERINEURAL ONCE
Status: COMPLETED | OUTPATIENT
Start: 2024-11-04 | End: 2024-11-04

## 2024-11-04 RX ADMIN — LIDOCAINE HYDROCHLORIDE AND EPINEPHRINE 3 ML: 10; 10 INJECTION, SOLUTION INFILTRATION; PERINEURAL at 16:03

## 2024-12-04 ENCOUNTER — PATIENT OUTREACH (OUTPATIENT)
Dept: OBGYN | Facility: CLINIC | Age: 34
End: 2024-12-04

## 2025-01-26 ENCOUNTER — HEALTH MAINTENANCE LETTER (OUTPATIENT)
Age: 35
End: 2025-01-26

## 2025-02-21 ENCOUNTER — TELEPHONE (OUTPATIENT)
Dept: MIDWIFE SERVICES | Facility: CLINIC | Age: 35
End: 2025-02-21

## 2025-02-21 NOTE — TELEPHONE ENCOUNTER
Appears needs repeat PAP, nicholas should be fine  Sophie Johnson RN on 2/21/2025 at 1:25 PM   [facility]

## 2025-02-21 NOTE — TELEPHONE ENCOUNTER
Patient just called and is asking if she needs a Colpo with an MD or just a F/U pap with Shari ? She thought Shari told her to see her? Please call pt back this afternoon

## 2025-04-17 ENCOUNTER — OFFICE VISIT (OUTPATIENT)
Dept: MIDWIFE SERVICES | Facility: CLINIC | Age: 35
End: 2025-04-17
Payer: MEDICAID

## 2025-04-17 VITALS — DIASTOLIC BLOOD PRESSURE: 76 MMHG | BODY MASS INDEX: 22.76 KG/M2 | WEIGHT: 156.8 LBS | SYSTOLIC BLOOD PRESSURE: 134 MMHG

## 2025-04-17 DIAGNOSIS — J45.20 INTERMITTENT ASTHMA, UNCOMPLICATED: ICD-10-CM

## 2025-04-17 DIAGNOSIS — Z11.3 ROUTINE SCREENING FOR STI (SEXUALLY TRANSMITTED INFECTION): ICD-10-CM

## 2025-04-17 DIAGNOSIS — Z12.4 SCREENING FOR MALIGNANT NEOPLASM OF CERVIX: ICD-10-CM

## 2025-04-17 DIAGNOSIS — Z97.5 NEXPLANON IN PLACE: ICD-10-CM

## 2025-04-17 DIAGNOSIS — F17.200 NICOTINE DEPENDENCE, UNCOMPLICATED, UNSPECIFIED NICOTINE PRODUCT TYPE: ICD-10-CM

## 2025-04-17 DIAGNOSIS — Z98.890 S/P LEEP OF CERVIX: ICD-10-CM

## 2025-04-17 DIAGNOSIS — J45.20 MILD INTERMITTENT ASTHMA WITHOUT COMPLICATION: ICD-10-CM

## 2025-04-17 DIAGNOSIS — Z00.00 ANNUAL PHYSICAL EXAM: Primary | ICD-10-CM

## 2025-04-17 DIAGNOSIS — F41.1 GAD (GENERALIZED ANXIETY DISORDER): ICD-10-CM

## 2025-04-17 RX ORDER — ESCITALOPRAM OXALATE 10 MG/1
10 TABLET ORAL DAILY
Qty: 60 TABLET | Refills: 0 | Status: SHIPPED | OUTPATIENT
Start: 2025-04-17

## 2025-04-17 RX ORDER — ALBUTEROL SULFATE 90 UG/1
2 INHALANT RESPIRATORY (INHALATION) EVERY 4 HOURS PRN
Qty: 8 G | Refills: 1 | Status: SHIPPED | OUTPATIENT
Start: 2025-04-17

## 2025-04-17 ASSESSMENT — PATIENT HEALTH QUESTIONNAIRE - PHQ9
5. POOR APPETITE OR OVEREATING: NEARLY EVERY DAY
SUM OF ALL RESPONSES TO PHQ QUESTIONS 1-9: 20

## 2025-04-17 ASSESSMENT — ANXIETY QUESTIONNAIRES
3. WORRYING TOO MUCH ABOUT DIFFERENT THINGS: NEARLY EVERY DAY
7. FEELING AFRAID AS IF SOMETHING AWFUL MIGHT HAPPEN: SEVERAL DAYS
IF YOU CHECKED OFF ANY PROBLEMS ON THIS QUESTIONNAIRE, HOW DIFFICULT HAVE THESE PROBLEMS MADE IT FOR YOU TO DO YOUR WORK, TAKE CARE OF THINGS AT HOME, OR GET ALONG WITH OTHER PEOPLE: EXTREMELY DIFFICULT
6. BECOMING EASILY ANNOYED OR IRRITABLE: NEARLY EVERY DAY
GAD7 TOTAL SCORE: 19
1. FEELING NERVOUS, ANXIOUS, OR ON EDGE: NEARLY EVERY DAY
GAD7 TOTAL SCORE: 19
2. NOT BEING ABLE TO STOP OR CONTROL WORRYING: NEARLY EVERY DAY
5. BEING SO RESTLESS THAT IT IS HARD TO SIT STILL: NEARLY EVERY DAY

## 2025-04-17 NOTE — PROGRESS NOTES
.Barbara is a 34 year old  female who presents for annual exam.     Besides routine health maintenance, she has no other health concerns today .  HPI:  Barbara is here today for her annual exam. She has a Nexplanon in place that was replaced early 5 months ago and her bleeding pattern returned to normal. She is due for her pap and desiring medication management for anxiety. Her anxiety occurs daily, and her main symptoms are difficulty concentrating, frequent crying and difficulty sleeping. She can usually fall asleep, but wakes around 3 am and is unable to fall back asleep. She currently attends therapy weekly but has otherwise not been treated with medication for her mental health. She declines any thought of self harm.  Menses are regular q 28-30 days and normal lasting 5 days.   Menses flow: normal.    Patient's last menstrual period was 2025 (approximate)..   Using Implant for contraception.    She is not currently considering pregnancy.    REPRODUCTIVE/GYNECOLOGIC HISTORY:  Barbara is sexually active with male partner(s) and is currently in monogamous relationship.   STI testing offered?  Accepted  History of abnormal Pap smear:  Yes  3/2012 Pap ASC-H, plan: colp  [facility] STEPHANI I, STEPHANI III, plan LEEP  12: [facility] - STEPHANI I & III. Plan LEEP  12: LEEP - STEPHANI III, clear margins. Plan pap in 6 months.  12: NIL pap. Plan pap in 6 months.  [gap in pap follow up/records]   5/6/15 Pap NILM, HPV negative  [gap in pap follow up/records]   21 NIL pap, neg HR HPV  23 NIL pap, + HR HPV (not 16/18). Plan 1 year cotest  24 Pt notified   24 Reminder MyChart   1/3/25 Reminder call-lm  25 Lost to follow-up for pap tracking     SOCIAL HISTORY  Abuse: does not report having previously been physical or sexually abused.    Do you feel safe in your environment? YES      She  reports that she has been smoking cigarettes. She has never used smokeless tobacco.  Tobacco Cessation Action Plan:  Information offered: Patient not interested at this time    Last PHQ-9 score on record =       2025     4:11 PM   PHQ-9 SCORE   PHQ-9 Total Score 20     Last GAD7 score on record =       2025     4:11 PM   DONTAE-7 SCORE   Total Score 19     HEALTH MAINTENANCE:    HEALTHY HABITS  Eating habits: eats regular meals and follows a balanced nutrition diet  Calcium/Vitamin D intake: source:  dairy   Exercise: How often do you exercise? Not currently - hoping to once the weather gets nicer  Have you had an eye exam in the last two years? YES     Do you routinely see the dentist once or twice yearly? YES       HISTORY:  OB History    Para Term  AB Living   2 0 0 0 2 0   SAB IAB Ectopic Multiple Live Births   0 0 0 0 0      # Outcome Date GA Lbr Wali/2nd Weight Sex Type Anes PTL Lv   2 AB            1 AB              Past Medical History:   Diagnosis Date    ADHD (attention deficit hyperactivity disorder)     Anxiety     Depression     History of cervical dysplasia     - ASC-H> STEPHANI 3 on colpo > LEEP    Mild intermittent asthma      Past Surgical History:   Procedure Laterality Date    INSERT CONTRACEPTIVE IMPLANT - NEXPLANON/IMPLANON  2021    LEEP TX, CERVICAL  2012    STEPHANI III, clear margins     Family History   Problem Relation Age of Onset    Breast Cancer Mother 49    Family History Negative Father     Family History Negative Brother      Social History     Socioeconomic History    Marital status: Single    Number of children: 0   Occupational History    Occupation: student     Employer: [employer]     Comment: [employer]   Tobacco Use    Smoking status: Every Day     Current packs/day: 0.50     Types: Cigarettes    Smokeless tobacco: Never    Tobacco comments:     daily(10 cigar) a day   Substance and Sexual Activity    Alcohol use: Yes     Comment: once a week    Drug use: No    Sexual activity: Yes     Partners: Male     Birth control/protection: Implant     Comment: Nexplanon  inserted on 04/02/2021   Other Topics Concern    Parent/sibling w/ CABG, MI or angioplasty before 65F 55M? No   Social History Narrative    Balanced Diet - Yes    Osteoporosis Prevention Measures - Dairy servings per day: 5 servings daily    Regular Exercise -  Yes [facility] 1 [facility], running and weights    Dental Exam - YES - Date: 1-07    Eye Exam - YES - Date: 12-06    Self Breast Exam - Yes    Abuse: Current or Past (Physical, Sexual or Emotional)- No    Do you feel safe in your environment - Yes    Guns stored in the home - Yes, locked    Sunscreen used - Yes    Seatbelts used - Yes    Lipids -  YES - Date: 5-03    Glucose -  NO    Colon Cancer Screening - No    Hemoccults - NO    Pap Test -  YES - Date: 11-05    Do you have any concerns about STD's -  No    Mammography - NO    DEXA - NO    Immunizations reviewed and up to date - Yes, last td 5-03    3-29-07   CRISTI Fernandez [facility]          Social Drivers of Health      Received from [employer] [employer]    Social Connections       Current Outpatient Medications:     albuterol (PROAIR HFA/PROVENTIL HFA/VENTOLIN HFA) 108 (90 Base) MCG/ACT inhaler, Inhale 2 puffs into the lungs every 4 hours as needed for shortness of breath or wheezing., Disp: 8 g, Rfl: 1    escitalopram (LEXAPRO) 10 MG tablet, Take 1 tablet (10 mg) by mouth daily., Disp: 60 tablet, Rfl: 0    etonogestrel (NEXPLANON) 68 MG IMPL, Inject 1 Device Subcutaneous once, Disp: , Rfl:      Allergies   Allergen Reactions    No Known Allergies     Amoxicillin Rash       Past medical, surgical, social and family history were reviewed and updated in EPIC.    ROS:   12 point review of systems negative other than symptoms noted below or in the HPI.  Psychiatric: Anxiety and Difficulty Sleeping    PHYSICAL EXAM:  /76   Wt 71.1 kg (156 lb 12.8 oz)   LMP 04/07/2025 (Approximate)   BMI 22.76 kg/m     BMI: Body  mass index is 22.76 kg/m .  Constitutional: healthy, alert and no distress  Neck: symmetrical, thyroid normal size, no masses present, no lymphadenopathy present.   Cardiovascular: RRR, no murmurs present  Respiratory: breathing unlabored, lungs CTA bilaterally  Breast:normal without masses, tenderness or nipple discharge and no palpable axillary masses or adenopathy  Gastrointestinal: abdomen soft, non-tender, bowel sounds present  PELVIC EXAM:  Vulva: No lesions, no adenopathy, BUS WNL  Vagina: Moist, pink, discharge normal, well rugated, no lesions  Cervix: smooth, pink, no visible lesions, pap and G/C collected  Rectal exam: deferred    ASSESSMENT/PLAN:    ICD-10-CM    1. Annual physical exam  Z00.00 HPV and Gynecologic Cytology Panel - Recommended Age 30 - 65 Years     Lipid panel reflex to direct LDL Fasting     CBC with platelets     TSH with free T4 reflex     Comprehensive metabolic panel (BMP + Alb, Alk Phos, ALT, AST, Total. Bili, TP)     Hemoglobin A1c     HIV Antigen Antibody Combo     Hepatitis C Screen Reflex to HCV RNA Quant and Genotype     Chlamydia trachomatis/Neisseria gonorrhoeae by PCR - Clinic Collect     Treponema Abs w Reflex to RPR and Titer      2. Screening for malignant neoplasm of cervix  Z12.4 HPV and Gynecologic Cytology Panel - Recommended Age 30 - 65 Years      3. DONTAE (generalized anxiety disorder)  F41.1 escitalopram (LEXAPRO) 10 MG tablet      4. Intermittent asthma, uncomplicated  J45.20 albuterol (PROAIR HFA/PROVENTIL HFA/VENTOLIN HFA) 108 (90 Base) MCG/ACT inhaler      5. Mild intermittent asthma without complication  J45.20 albuterol (PROAIR HFA/PROVENTIL HFA/VENTOLIN HFA) 108 (90 Base) MCG/ACT inhaler      6. Nexplanon in place  Z97.5       7. S/P LEEP of cervix  Z98.890       8. Nicotine dependence, uncomplicated, unspecified nicotine product type  F17.200       9. BMI 22.0-22.9, adult  Z68.22       10. Routine screening for STI (sexually transmitted infection)  Z11.3 HIV  Antigen Antibody Combo     Hepatitis C Screen Reflex to HCV RNA Quant and Genotype     Chlamydia trachomatis/Neisseria gonorrhoeae by PCR - Clinic Collect     Treponema Abs w Reflex to RPR and Titer        #Annual Preventative Care  - Normal physical exam  - Mood is stable  - Labs as outlined above  - Cervical Cancer screening due today, repeat in 3 years if NILM/neg  - Mammogram breast cancer screening next due at age 39   - Diabetes screening due, A1c future ordered  - Lipid screening due, future ordered  - Thyroid screening due  future ordered  - HCV/HIV screening future ordered  - Routine STI screening including HCV, HIV, syphilis, GC/CT  - BMI 22.73    #Anxiety  - Rx sent for 10 mg Lexapro PO daily, #60  - Plan to follow up in office in 6-8 weeks for medication management, anxiety evaluation, and labwork.  - Continue weekly therapy      COUNSELING:   Discussed health maintenance including regular exercise (150 minutes of moderate activity is recommended per week), healthy diet/nutrition, and contraception.  Discussed breast awareness and importance of notifying PCP of any dimpling, lumps or breast changes. Discussed early mammography screening beginning at 38 yo due to family history.  Discussed first line medication management for anxiety being SSRIs which is the same medication used for depression. It may take up to 4 weeks for the affect to be felt. Reviewed side effects including nausea, diarrhea, and libido changes - symptoms are dose dependent and if felt, should subside after 1-2 weeks. Discussed importance of continuing therapy for the greatest management of anxiety.    Jose Gonzales, Student Nurse Midwife     I was present with the student who participated in the service and in the documentation of the note. I have verified the history and personally performed the physical exam and medical decision-making. I agree with the assessment and plan of care as documented in the note.    15 minutes spent  discussing concerns outside of routine preventative care and coordinating/charting care.  50 minutes spent TOTAL by me on the date of the encounter doing chart review, history and exam, documentation and further activities per the note,this time does not include student or teaching time.     Priya Hernandez, LORI, CNM

## 2025-04-21 LAB
HPV HR 12 DNA CVX QL NAA+PROBE: NEGATIVE
HPV16 DNA CVX QL NAA+PROBE: NEGATIVE
HPV18 DNA CVX QL NAA+PROBE: NEGATIVE
HUMAN PAPILLOMA VIRUS FINAL DIAGNOSIS: NORMAL

## 2025-04-24 LAB
BKR AP ASSOCIATED HPV REPORT: NORMAL
BKR LAB AP GYN ADEQUACY: NORMAL
BKR LAB AP GYN INTERPRETATION: NORMAL
BKR LAB AP PREVIOUS ABNL DX: NORMAL
BKR LAB AP PREVIOUS ABNORMAL: NORMAL
PATH REPORT.COMMENTS IMP SPEC: NORMAL
PATH REPORT.COMMENTS IMP SPEC: NORMAL
PATH REPORT.RELEVANT HX SPEC: NORMAL

## 2025-06-18 DIAGNOSIS — F41.1 GAD (GENERALIZED ANXIETY DISORDER): ICD-10-CM

## 2025-06-18 RX ORDER — ESCITALOPRAM OXALATE 10 MG/1
10 TABLET ORAL DAILY
Qty: 30 TABLET | Refills: 0 | Status: SHIPPED | OUTPATIENT
Start: 2025-06-18

## 2025-06-18 NOTE — TELEPHONE ENCOUNTER
Requested Prescriptions   Pending Prescriptions Disp Refills    escitalopram (LEXAPRO) 10 MG tablet 60 tablet 0     Sig: Take 1 tablet (10 mg) by mouth daily.       SSRIs Protocol Failed - 6/18/2025  9:34 AM        Failed - DONTAE-7 score of less than 5 in past 6 months.     Please review last DONTAE-7 score.       12/21/2023    11:10 AM 4/17/2025     4:11 PM   DONTAE-7 SCORE   Total Score 7 19             Passed - Medication is active on med list and the sig matches. RN to manually verify dose and sig if red X/fail.     If the protocol passes (green check), you do not need to verify med dose and sig.    A prescription matches if they are the same clinical intention.    For Example: once daily and every morning are the same.    The protocol can not identify upper and lower case letters as matching and will fail.     For Example: Take 1 tablet (50 mg) by mouth daily     TAKE 1 TABLET (50 MG) BY MOUTH DAILY    For all fails (red x), verify dose and sig.    If the refill does match what is on file, the RN can still proceed to approve the refill request.       If they do not match, route to the appropriate provider.             Passed - Recent (12 month) or future (90 days) visit with authorizing provider's specialty (provided they have been seen in the past 15 months)     The patient must have completed an in-person or virtual visit within the past 12 months or has a future visit scheduled within the next 90 days with the authorizing provider s specialty.  Urgent care and e-visits do not qualify as an office visit for this protocol.          Passed - Medication indicated for associated diagnosis     Medication is associated with one or more of the following diagnoses:              Anxiety             Bipolar  Depression  Obsessive-compulsive disorder             Panic disorder  Postmenopausal flushing             Premenstrual dysphoric disorder             Social phobia   Adjustment disorder with depressed mood   Mood  disorder   Adjustment disorder with anxious mood          Passed - Patient is age 18 or older        Passed - No active pregnancy on record        Passed - No positive pregnancy test in last 12 months           Last Written Prescription Date:  04/17/2025  Last Fill Quantity: 60,  # refills: 0   Last office visit: 4/17/2025 ; last virtual visit: Visit date not found with prescribing provider:  Priya Hernandez CNM   Future Office Visit: no future appointment scheduled     One month supply sent  Appointment needed for further refills  Liat Ellis RN on 6/18/2025 at 9:42 AM

## 2025-07-30 ENCOUNTER — MYC REFILL (OUTPATIENT)
Dept: MIDWIFE SERVICES | Facility: CLINIC | Age: 35
End: 2025-07-30
Payer: MEDICAID

## 2025-07-30 DIAGNOSIS — J45.20 MILD INTERMITTENT ASTHMA WITHOUT COMPLICATION: ICD-10-CM

## 2025-07-30 DIAGNOSIS — J45.20 INTERMITTENT ASTHMA, UNCOMPLICATED: ICD-10-CM

## 2025-07-30 DIAGNOSIS — F41.1 GAD (GENERALIZED ANXIETY DISORDER): ICD-10-CM

## 2025-07-30 RX ORDER — ALBUTEROL SULFATE 90 UG/1
2 INHALANT RESPIRATORY (INHALATION) EVERY 4 HOURS PRN
Qty: 8 G | Refills: 1 | Status: SHIPPED | OUTPATIENT
Start: 2025-07-30

## 2025-07-30 RX ORDER — ESCITALOPRAM OXALATE 10 MG/1
10 TABLET ORAL DAILY
Qty: 90 TABLET | Refills: 0 | Status: SHIPPED | OUTPATIENT
Start: 2025-07-30

## 2025-07-30 RX ORDER — ESCITALOPRAM OXALATE 10 MG/1
10 TABLET ORAL DAILY
Qty: 30 TABLET | Refills: 0 | OUTPATIENT
Start: 2025-07-30

## 2025-07-30 NOTE — TELEPHONE ENCOUNTER
"Requested Prescriptions   Pending Prescriptions Disp Refills    albuterol (PROAIR HFA/PROVENTIL HFA/VENTOLIN HFA) 108 (90 Base) MCG/ACT inhaler 8 g 1     Sig: Inhale 2 puffs into the lungs every 4 hours as needed for shortness of breath or wheezing.       Short-Acting Beta Agonist Inhalers Protocol  Failed - 7/30/2025  2:37 PM        Failed - Asthma control assessment score within normal limits in last 6 months     Please review ACT score.       7/1/2015   Asthma Control Test   ACT Total Score 10             Passed - Patient is age 12 or older        Passed - Medication is active on med list and the sig matches. RN to manually verify dose and sig if red X/fail.     If the protocol passes (green check), you do not need to verify med dose and sig.    A prescription matches if they are the same clinical intention.    For Example: once daily and every morning are the same.    The protocol can not identify upper and lower case letters as matching and will fail.     For Example: Take 1 tablet (50 mg) by mouth daily     TAKE 1 TABLET (50 MG) BY MOUTH DAILY    For all fails (red x), verify dose and sig.    If the refill does match what is on file, the RN can still proceed to approve the refill request.       If they do not match, route to the appropriate provider.             Passed - Recent (6 month) or future (90 days) visit with the authorizing provider's specialty (provided they have been seen in the past 9 months)     Patient had office visit in the last 6 months or has a visit in the next 30 days with authorizing provider or within the authorizing provider's specialty.  See \"Patient Info\" tab in inbasket, or \"Choose Columns\" in Meds & Orders section of the refill encounter.              escitalopram (LEXAPRO) 10 MG tablet 30 tablet 0     Sig: Take 1 tablet (10 mg) by mouth daily.       SSRIs Protocol Failed - 7/30/2025  2:37 PM        Failed - DONTAE-7 score of less than 5 in past 6 months.     Please review last DONTAE-7 " score.       12/21/2023    11:10 AM 4/17/2025     4:11 PM   DONTAE-7 SCORE   Total Score 7 19             Passed - Medication is active on med list and the sig matches. RN to manually verify dose and sig if red X/fail.     If the protocol passes (green check), you do not need to verify med dose and sig.    A prescription matches if they are the same clinical intention.    For Example: once daily and every morning are the same.    The protocol can not identify upper and lower case letters as matching and will fail.     For Example: Take 1 tablet (50 mg) by mouth daily     TAKE 1 TABLET (50 MG) BY MOUTH DAILY    For all fails (red x), verify dose and sig.    If the refill does match what is on file, the RN can still proceed to approve the refill request.       If they do not match, route to the appropriate provider.             Passed - Recent (12 month) or future (90 days) visit with authorizing provider's specialty (provided they have been seen in the past 15 months)     The patient must have completed an in-person or virtual visit within the past 12 months or has a future visit scheduled within the next 90 days with the authorizing provider s specialty.  Urgent care and e-visits do not qualify as an office visit for this protocol.          Passed - Medication indicated for associated diagnosis     Medication is associated with one or more of the following diagnoses:              Anxiety             Bipolar  Depression  Obsessive-compulsive disorder             Panic disorder  Postmenopausal flushing             Premenstrual dysphoric disorder             Social phobia   Adjustment disorder with depressed mood   Mood disorder   Adjustment disorder with anxious mood          Passed - Patient is age 18 or older        Passed - No active pregnancy on record        Passed - No positive pregnancy test in last 12 months           Last Written Prescription Date:  6/18/25  Last Fill Quantity: 30,  # refills: 0   Last office  visit: 4/17/2025 ; last virtual visit: Visit date not found with prescribing provider:  MAR Hernandez - Plan to follow up in office in 6-8 weeks for medication management, anxiety evaluation, and labwor   Future Office Visit:  nothing scheduled or completed as instructed    Patient Comment: Any chance we can fill this for the next few months? I might not have insurance after tomorrow.       Routing to provider for approval - see patient comments.    Izabel Park RN on 7/30/2025 at 2:41 PM

## 2025-07-30 NOTE — TELEPHONE ENCOUNTER
4/17/2025 annual kathie Hernandez CNM:  Discussed first line medication management for anxiety being SSRIs which is the same medication used for depression. It may take up to 4 weeks for the affect to be felt. Reviewed side effects including nausea, diarrhea, and libido changes - symptoms are dose dependent and if felt, should subside after 1-2 weeks. Discussed importance of continuing therapy for the greatest management of anxiety.     Jose Gonzales, Student Nurse Midwife     #Anxiety  - Rx sent for 10 mg Lexapro PO daily, #60  - Plan to follow up in office in 6-8 weeks for medication management, anxiety evaluation, and labwork.  - Continue weekly therapy    1 month extension already given - needs f/up  Denied Rx    Izabel Park RN on 7/30/2025 at 9:48 AM

## 2025-07-30 NOTE — TELEPHONE ENCOUNTER
Requested Prescriptions   Pending Prescriptions Disp Refills    escitalopram (LEXAPRO) 10 MG tablet 30 tablet 0     Sig: Take 1 tablet (10 mg) by mouth daily.       SSRIs Protocol Failed - 7/30/2025  9:39 AM        Failed - DONTAE-7 score of less than 5 in past 6 months.     Please review last DONTAE-7 score.       12/21/2023    11:10 AM 4/17/2025     4:11 PM   DONTAE-7 SCORE   Total Score 7 19             Passed - Medication is active on med list and the sig matches. RN to manually verify dose and sig if red X/fail.     If the protocol passes (green check), you do not need to verify med dose and sig.    A prescription matches if they are the same clinical intention.    For Example: once daily and every morning are the same.    The protocol can not identify upper and lower case letters as matching and will fail.     For Example: Take 1 tablet (50 mg) by mouth daily     TAKE 1 TABLET (50 MG) BY MOUTH DAILY    For all fails (red x), verify dose and sig.    If the refill does match what is on file, the RN can still proceed to approve the refill request.       If they do not match, route to the appropriate provider.             Passed - Recent (12 month) or future (90 days) visit with authorizing provider's specialty (provided they have been seen in the past 15 months)     The patient must have completed an in-person or virtual visit within the past 12 months or has a future visit scheduled within the next 90 days with the authorizing provider s specialty.  Urgent care and e-visits do not qualify as an office visit for this protocol.          Passed - Medication indicated for associated diagnosis     Medication is associated with one or more of the following diagnoses:              Anxiety             Bipolar  Depression  Obsessive-compulsive disorder             Panic disorder  Postmenopausal flushing             Premenstrual dysphoric disorder             Social phobia   Adjustment disorder with depressed mood   Mood  disorder   Adjustment disorder with anxious mood          Passed - Patient is age 18 or older        Passed - No active pregnancy on record        Passed - No positive pregnancy test in last 12 months           Last Written Prescription Date:  6/18/25  Last Fill Quantity: 30,  # refills: 0   Last office visit: 4/17/2025 ; last virtual visit: Visit date not found with prescribing provider:  Priya Hernandez   Future Office Visit: